# Patient Record
Sex: MALE
[De-identification: names, ages, dates, MRNs, and addresses within clinical notes are randomized per-mention and may not be internally consistent; named-entity substitution may affect disease eponyms.]

---

## 2019-05-23 PROBLEM — Z00.00 ENCOUNTER FOR PREVENTIVE HEALTH EXAMINATION: Status: ACTIVE | Noted: 2019-05-23

## 2019-06-05 ENCOUNTER — APPOINTMENT (OUTPATIENT)
Dept: OTOLARYNGOLOGY | Facility: CLINIC | Age: 44
End: 2019-06-05
Payer: COMMERCIAL

## 2019-06-05 VITALS
WEIGHT: 291 LBS | HEIGHT: 77 IN | SYSTOLIC BLOOD PRESSURE: 124 MMHG | BODY MASS INDEX: 34.36 KG/M2 | DIASTOLIC BLOOD PRESSURE: 80 MMHG

## 2019-06-05 DIAGNOSIS — Z78.9 OTHER SPECIFIED HEALTH STATUS: ICD-10-CM

## 2019-06-05 DIAGNOSIS — I10 ESSENTIAL (PRIMARY) HYPERTENSION: ICD-10-CM

## 2019-06-05 DIAGNOSIS — Z80.9 FAMILY HISTORY OF MALIGNANT NEOPLASM, UNSPECIFIED: ICD-10-CM

## 2019-06-05 DIAGNOSIS — F17.200 NICOTINE DEPENDENCE, UNSPECIFIED, UNCOMPLICATED: ICD-10-CM

## 2019-06-05 DIAGNOSIS — Z21 ASYMPTOMATIC HUMAN IMMUNODEFICIENCY VIRUS [HIV] INFECTION STATUS: ICD-10-CM

## 2019-06-05 DIAGNOSIS — H90.11 CONDUCTIVE HEARING LOSS, UNILATERAL, RIGHT EAR, WITH UNRESTRICTED HEARING ON THE CONTRALATERAL SIDE: ICD-10-CM

## 2019-06-05 PROCEDURE — 92557 COMPREHENSIVE HEARING TEST: CPT

## 2019-06-05 PROCEDURE — 69420 INCISION OF EARDRUM: CPT | Mod: RT

## 2019-06-05 PROCEDURE — 99204 OFFICE O/P NEW MOD 45 MIN: CPT | Mod: 25

## 2019-06-05 PROCEDURE — 92550 TYMPANOMETRY & REFLEX THRESH: CPT

## 2019-06-05 RX ORDER — FENOFIBRATE 150 MG/1
CAPSULE ORAL
Refills: 0 | Status: ACTIVE | COMMUNITY

## 2019-06-05 RX ORDER — DOLUTEGRAVIR SODIUM AND RILPIVIRINE HYDROCHLORIDE 50; 25 MG/1; MG/1
50-25 TABLET, FILM COATED ORAL
Refills: 0 | Status: ACTIVE | COMMUNITY

## 2019-06-05 NOTE — PROCEDURE
[Same] : same as the Pre Op Dx. [] : Myringotomy [FreeTextEntry6] : thickened serous fluid [FreeTextEntry4] : phenol

## 2019-06-05 NOTE — REASON FOR VISIT
[Initial Evaluation] : an initial evaluation for [FreeTextEntry2] : fluid behind the tympanic membrane

## 2019-06-05 NOTE — HISTORY OF PRESENT ILLNESS
[de-identified] : 43 year old patient is present today for fluid behind the right tympanic membrane. Patient also c/o left ear hearing diminished.  Patient admits he was seen by another ENT and told to have myringotomy. Patient never went through with the procedure. Patient admits using Afrin and Flonase with no improvement. Patient states he feels this all started after him having pneumonia and a sinus infection in January. Patient also recalls this happening about 9 years ago but he remembers that it cleared up in about 3 weeks. Pt reports negative scope 2 weeks ago.

## 2019-06-05 NOTE — PHYSICAL EXAM
[Midline] : trachea located in midline position [Normal] : cranial nerves 2-12 intact [de-identified] : opaque right TM and nickie

## 2019-06-18 ENCOUNTER — APPOINTMENT (OUTPATIENT)
Dept: OTOLARYNGOLOGY | Facility: CLINIC | Age: 44
End: 2019-06-18
Payer: COMMERCIAL

## 2019-06-18 DIAGNOSIS — H69.83 OTHER SPECIFIED DISORDERS OF EUSTACHIAN TUBE, BILATERAL: ICD-10-CM

## 2019-06-18 DIAGNOSIS — H65.00 ACUTE SEROUS OTITIS MEDIA, UNSPECIFIED EAR: ICD-10-CM

## 2019-06-18 PROCEDURE — 99212 OFFICE O/P EST SF 10 MIN: CPT | Mod: 25

## 2019-06-18 PROCEDURE — 92550 TYMPANOMETRY & REFLEX THRESH: CPT

## 2019-06-18 NOTE — REASON FOR VISIT
[Subsequent Evaluation] : a subsequent evaluation for [FreeTextEntry2] : acute otitis media, and CHL.

## 2019-06-18 NOTE — HISTORY OF PRESENT ILLNESS
[FreeTextEntry1] : 6/18/19 Patient is here for a 2 week follow up for acute otitis media, and CHL.  patient had right myringotomy june 5th. Reports feeling much better. Pt is able to equalize. Pt has no discomfort.